# Patient Record
Sex: FEMALE | Race: ASIAN | Employment: FULL TIME | URBAN - METROPOLITAN AREA
[De-identification: names, ages, dates, MRNs, and addresses within clinical notes are randomized per-mention and may not be internally consistent; named-entity substitution may affect disease eponyms.]

---

## 2023-04-06 ENCOUNTER — OFFICE VISIT (OUTPATIENT)
Dept: PRIMARY CARE CLINIC | Age: 53
End: 2023-04-06
Payer: COMMERCIAL

## 2023-04-06 VITALS
SYSTOLIC BLOOD PRESSURE: 127 MMHG | BODY MASS INDEX: 33.97 KG/M2 | OXYGEN SATURATION: 96 % | TEMPERATURE: 97.5 F | HEIGHT: 55 IN | DIASTOLIC BLOOD PRESSURE: 59 MMHG | WEIGHT: 146.8 LBS | HEART RATE: 64 BPM

## 2023-04-06 DIAGNOSIS — M54.12 CERVICAL RADICULOPATHY: Primary | ICD-10-CM

## 2023-04-06 DIAGNOSIS — M54.16 LUMBAR BACK PAIN WITH RADICULOPATHY AFFECTING LOWER EXTREMITY: ICD-10-CM

## 2023-04-06 PROCEDURE — 99204 OFFICE O/P NEW MOD 45 MIN: CPT | Performed by: NURSE PRACTITIONER

## 2023-04-06 RX ORDER — NAPROXEN 500 MG/1
500 TABLET ORAL 2 TIMES DAILY PRN
Qty: 60 TABLET | Refills: 1 | Status: SHIPPED | OUTPATIENT
Start: 2023-04-06

## 2023-04-06 SDOH — ECONOMIC STABILITY: FOOD INSECURITY: WITHIN THE PAST 12 MONTHS, THE FOOD YOU BOUGHT JUST DIDN'T LAST AND YOU DIDN'T HAVE MONEY TO GET MORE.: NEVER TRUE

## 2023-04-06 SDOH — ECONOMIC STABILITY: HOUSING INSECURITY
IN THE LAST 12 MONTHS, WAS THERE A TIME WHEN YOU DID NOT HAVE A STEADY PLACE TO SLEEP OR SLEPT IN A SHELTER (INCLUDING NOW)?: NO

## 2023-04-06 SDOH — ECONOMIC STABILITY: INCOME INSECURITY: HOW HARD IS IT FOR YOU TO PAY FOR THE VERY BASICS LIKE FOOD, HOUSING, MEDICAL CARE, AND HEATING?: NOT HARD AT ALL

## 2023-04-06 SDOH — ECONOMIC STABILITY: FOOD INSECURITY: WITHIN THE PAST 12 MONTHS, YOU WORRIED THAT YOUR FOOD WOULD RUN OUT BEFORE YOU GOT MONEY TO BUY MORE.: NEVER TRUE

## 2023-04-06 ASSESSMENT — PATIENT HEALTH QUESTIONNAIRE - PHQ9
SUM OF ALL RESPONSES TO PHQ QUESTIONS 1-9: 0
SUM OF ALL RESPONSES TO PHQ9 QUESTIONS 1 & 2: 0
1. LITTLE INTEREST OR PLEASURE IN DOING THINGS: 0
SUM OF ALL RESPONSES TO PHQ QUESTIONS 1-9: 0
2. FEELING DOWN, DEPRESSED OR HOPELESS: 0
SUM OF ALL RESPONSES TO PHQ QUESTIONS 1-9: 0
SUM OF ALL RESPONSES TO PHQ QUESTIONS 1-9: 0

## 2023-04-06 NOTE — PROGRESS NOTES
[address] [facility] [address] PRIMARY CARE  [address] [place] [id number]  Dept: [phone]  Dept Fax: [phone]     4/6/2023      Ivanna Shailesh   1970     Chief Complaint   Patient presents with    Other     Pain in both hands starts from top of shoulders and goes down to the end of fingers , swelling in fingers when she wakes up back pain disc l4 and l5        HPI     Patient presents with her son and  was used for this visit. Patient has complaint of bilateral arm pain. States it starts in top of shoulders and goes down to tip of fingers. Pain is throbbing, severe. Pain is sometimes worse at night and sometimes worse in the mornings. She states pain is worse with movement. She denies any numbness but does have tingling when pain is present. Pain comes and goes, but is present every day. She denies any weakness in arms or hands. She does have chronic neck pain. She states it has been going on more than 10 years. She has not seen a specialist for this before. She also has chronic low back pain. States at least for 16 years. She occasionally uses Tylenol and Ibuprofen for pain. Pain often radiates down left leg and sometimes the right. She denies any weakness. States she did have imaging a few years ago in [facility]. Records not available. PHQ Scores 4/6/2023   PHQ2 Score 0   PHQ9 Score 0     Interpretation of Total Score Depression Severity: 1-4 = Minimal depression, 5-9 = Mild depression, 10-14 = Moderate depression, 15-19 = Moderately severe depression, 20-27 = Severe depression     Prior to Visit Medications    Not on File       No past medical history on file. Social History     Tobacco Use    Smoking status: Never    Smokeless tobacco: Never        No past surgical history on file. No Known Allergies     No family history on file.      Patient's past medical history, surgical history, family history, medications, and allergies  were all

## 2023-04-07 ASSESSMENT — ENCOUNTER SYMPTOMS
BACK PAIN: 1
ABDOMINAL PAIN: 0
COUGH: 0

## 2023-08-30 ENCOUNTER — TELEPHONE (OUTPATIENT)
Dept: PRIMARY CARE CLINIC | Age: 53
End: 2023-08-30

## 2023-08-30 NOTE — TELEPHONE ENCOUNTER
Please call patient and encourage her to set up physical with me. This does not need to be urgent but I would like it to be done within the next few weeks if possible. If you do schedule her please schedule for 40-minute visit. She will need an English .

## 2023-08-31 NOTE — TELEPHONE ENCOUNTER
Patient was called with  and a number was left for the patient to get scheduled waiting for call back

## 2023-09-25 ENCOUNTER — HOSPITAL ENCOUNTER (EMERGENCY)
Age: 53
Discharge: HOME | End: 2023-09-25
Attending: EMERGENCY MEDICINE
Payer: COMMERCIAL

## 2023-09-25 ENCOUNTER — APPOINTMENT (OUTPATIENT)
Dept: GENERAL RADIOLOGY | Age: 53
End: 2023-09-25
Payer: COMMERCIAL

## 2023-09-25 VITALS
WEIGHT: 152.2 LBS | BODY MASS INDEX: 28.01 KG/M2 | DIASTOLIC BLOOD PRESSURE: 64 MMHG | TEMPERATURE: 100.3 F | RESPIRATION RATE: 22 BRPM | SYSTOLIC BLOOD PRESSURE: 98 MMHG | OXYGEN SATURATION: 95 % | HEIGHT: 62 IN | HEART RATE: 97 BPM

## 2023-09-25 DIAGNOSIS — U07.1 COVID-19: Primary | ICD-10-CM

## 2023-09-25 LAB
ALBUMIN SERPL-MCNC: 4.3 G/DL (ref 3.4–5)
ALBUMIN/GLOB SERPL: 1.6 {RATIO} (ref 1.1–2.2)
ALP SERPL-CCNC: 100 U/L (ref 40–129)
ALT SERPL-CCNC: 12 U/L (ref 10–40)
AMORPH SED URNS QL MICRO: ABNORMAL /HPF
ANION GAP SERPL CALCULATED.3IONS-SCNC: 8 MMOL/L (ref 3–16)
AST SERPL-CCNC: 15 U/L (ref 15–37)
BACTERIA URNS QL MICRO: ABNORMAL /HPF
BASOPHILS # BLD: 0 K/UL (ref 0–0.2)
BASOPHILS NFR BLD: 0.4 %
BILIRUB SERPL-MCNC: <0.2 MG/DL (ref 0–1)
BILIRUB UR QL STRIP.AUTO: NEGATIVE
BUN SERPL-MCNC: 11 MG/DL (ref 7–20)
CALCIUM SERPL-MCNC: 9 MG/DL (ref 8.3–10.6)
CHLORIDE SERPL-SCNC: 105 MMOL/L (ref 99–110)
CLARITY UR: ABNORMAL
CO2 SERPL-SCNC: 27 MMOL/L (ref 21–32)
COLOR UR: YELLOW
CREAT SERPL-MCNC: 0.6 MG/DL (ref 0.6–1.1)
DEPRECATED RDW RBC AUTO: 12.6 % (ref 12.4–15.4)
EKG ATRIAL RATE: 100 BPM
EKG DIAGNOSIS: NORMAL
EKG P AXIS: 61 DEGREES
EKG P-R INTERVAL: 172 MS
EKG Q-T INTERVAL: 338 MS
EKG QRS DURATION: 86 MS
EKG QTC CALCULATION (BAZETT): 436 MS
EKG R AXIS: 89 DEGREES
EKG T AXIS: 36 DEGREES
EKG VENTRICULAR RATE: 100 BPM
EOSINOPHIL # BLD: 0 K/UL (ref 0–0.6)
EOSINOPHIL NFR BLD: 0.3 %
EPI CELLS #/AREA URNS HPF: ABNORMAL /HPF (ref 0–5)
FLUAV RNA UPPER RESP QL NAA+PROBE: NEGATIVE
FLUBV AG NPH QL: NEGATIVE
GFR SERPLBLD CREATININE-BSD FMLA CKD-EPI: >60 ML/MIN/{1.73_M2}
GLUCOSE SERPL-MCNC: 102 MG/DL (ref 70–99)
GLUCOSE UR STRIP.AUTO-MCNC: NEGATIVE MG/DL
HCT VFR BLD AUTO: 36.3 % (ref 36–48)
HGB BLD-MCNC: 12.6 G/DL (ref 12–16)
HGB UR QL STRIP.AUTO: NEGATIVE
KETONES UR STRIP.AUTO-MCNC: NEGATIVE MG/DL
LEUKOCYTE ESTERASE UR QL STRIP.AUTO: ABNORMAL
LIPASE SERPL-CCNC: 32 U/L (ref 13–60)
LYMPHOCYTES # BLD: 0.3 K/UL (ref 1–5.1)
LYMPHOCYTES NFR BLD: 6.7 %
MAGNESIUM SERPL-MCNC: 1.8 MG/DL (ref 1.8–2.4)
MCH RBC QN AUTO: 32.3 PG (ref 26–34)
MCHC RBC AUTO-ENTMCNC: 34.6 G/DL (ref 31–36)
MCV RBC AUTO: 93.4 FL (ref 80–100)
MONOCYTES # BLD: 0.5 K/UL (ref 0–1.3)
MONOCYTES NFR BLD: 8.9 %
NEUTROPHILS # BLD: 4.4 K/UL (ref 1.7–7.7)
NEUTROPHILS NFR BLD: 83.7 %
NITRITE UR QL STRIP.AUTO: NEGATIVE
NT-PROBNP SERPL-MCNC: 200 PG/ML (ref 0–124)
PH UR STRIP.AUTO: 6 [PH] (ref 5–8)
PLATELET # BLD AUTO: 140 K/UL (ref 135–450)
PMV BLD AUTO: 10.7 FL (ref 5–10.5)
POTASSIUM SERPL-SCNC: 3.8 MMOL/L (ref 3.5–5.1)
PROT SERPL-MCNC: 7 G/DL (ref 6.4–8.2)
PROT UR STRIP.AUTO-MCNC: NEGATIVE MG/DL
RBC # BLD AUTO: 3.89 M/UL (ref 4–5.2)
RBC #/AREA URNS HPF: ABNORMAL /HPF (ref 0–4)
SARS-COV-2 RDRP RESP QL NAA+PROBE: DETECTED
SODIUM SERPL-SCNC: 140 MMOL/L (ref 136–145)
SP GR UR STRIP.AUTO: 1.02 (ref 1–1.03)
TROPONIN, HIGH SENSITIVITY: <6 NG/L (ref 0–14)
UA COMPLETE W REFLEX CULTURE PNL UR: YES
UA DIPSTICK W REFLEX MICRO PNL UR: YES
URN SPEC COLLECT METH UR: ABNORMAL
UROBILINOGEN UR STRIP-ACNC: 0.2 E.U./DL
WBC # BLD AUTO: 5.2 K/UL (ref 4–11)
WBC #/AREA URNS HPF: ABNORMAL /HPF (ref 0–5)

## 2023-09-25 PROCEDURE — 83880 ASSAY OF NATRIURETIC PEPTIDE: CPT

## 2023-09-25 PROCEDURE — 83690 ASSAY OF LIPASE: CPT

## 2023-09-25 PROCEDURE — 71046 X-RAY EXAM CHEST 2 VIEWS: CPT

## 2023-09-25 PROCEDURE — 93005 ELECTROCARDIOGRAM TRACING: CPT

## 2023-09-25 PROCEDURE — 87635 SARS-COV-2 COVID-19 AMP PRB: CPT

## 2023-09-25 PROCEDURE — 83735 ASSAY OF MAGNESIUM: CPT

## 2023-09-25 PROCEDURE — 85025 COMPLETE CBC W/AUTO DIFF WBC: CPT

## 2023-09-25 PROCEDURE — 99285 EMERGENCY DEPT VISIT HI MDM: CPT

## 2023-09-25 PROCEDURE — 96375 TX/PRO/DX INJ NEW DRUG ADDON: CPT

## 2023-09-25 PROCEDURE — 87086 URINE CULTURE/COLONY COUNT: CPT

## 2023-09-25 PROCEDURE — 2580000003 HC RX 258

## 2023-09-25 PROCEDURE — 84484 ASSAY OF TROPONIN QUANT: CPT

## 2023-09-25 PROCEDURE — 36415 COLL VENOUS BLD VENIPUNCTURE: CPT

## 2023-09-25 PROCEDURE — 96374 THER/PROPH/DIAG INJ IV PUSH: CPT

## 2023-09-25 PROCEDURE — 80053 COMPREHEN METABOLIC PANEL: CPT

## 2023-09-25 PROCEDURE — 6370000000 HC RX 637 (ALT 250 FOR IP)

## 2023-09-25 PROCEDURE — 87804 INFLUENZA ASSAY W/OPTIC: CPT

## 2023-09-25 PROCEDURE — 6360000002 HC RX W HCPCS

## 2023-09-25 PROCEDURE — 81001 URINALYSIS AUTO W/SCOPE: CPT

## 2023-09-25 RX ORDER — ONDANSETRON 4 MG/1
4 TABLET, ORALLY DISINTEGRATING ORAL 3 TIMES DAILY PRN
Qty: 21 TABLET | Refills: 0 | Status: SHIPPED | OUTPATIENT
Start: 2023-09-25

## 2023-09-25 RX ORDER — KETOROLAC TROMETHAMINE 30 MG/ML
15 INJECTION, SOLUTION INTRAMUSCULAR; INTRAVENOUS ONCE
Status: COMPLETED | OUTPATIENT
Start: 2023-09-25 | End: 2023-09-25

## 2023-09-25 RX ORDER — SODIUM CHLORIDE, SODIUM LACTATE, POTASSIUM CHLORIDE, AND CALCIUM CHLORIDE .6; .31; .03; .02 G/100ML; G/100ML; G/100ML; G/100ML
1000 INJECTION, SOLUTION INTRAVENOUS ONCE
Status: COMPLETED | OUTPATIENT
Start: 2023-09-25 | End: 2023-09-25

## 2023-09-25 RX ORDER — ACETAMINOPHEN 500 MG
1000 TABLET ORAL
Status: COMPLETED | OUTPATIENT
Start: 2023-09-25 | End: 2023-09-25

## 2023-09-25 RX ORDER — ONDANSETRON 2 MG/ML
4 INJECTION INTRAMUSCULAR; INTRAVENOUS ONCE
Status: COMPLETED | OUTPATIENT
Start: 2023-09-25 | End: 2023-09-25

## 2023-09-25 RX ADMIN — KETOROLAC TROMETHAMINE 15 MG: 30 INJECTION, SOLUTION INTRAMUSCULAR; INTRAVENOUS at 09:40

## 2023-09-25 RX ADMIN — SODIUM CHLORIDE, POTASSIUM CHLORIDE, SODIUM LACTATE AND CALCIUM CHLORIDE 1000 ML: 600; 310; 30; 20 INJECTION, SOLUTION INTRAVENOUS at 09:38

## 2023-09-25 RX ADMIN — ONDANSETRON 4 MG: 2 INJECTION INTRAMUSCULAR; INTRAVENOUS at 09:39

## 2023-09-25 RX ADMIN — ACETAMINOPHEN 1000 MG: 500 TABLET ORAL at 09:41

## 2023-09-25 ASSESSMENT — ENCOUNTER SYMPTOMS
CONSTIPATION: 0
VOMITING: 1
COUGH: 1
EYE DISCHARGE: 0
WHEEZING: 0
NAUSEA: 1
BACK PAIN: 0
DIARRHEA: 0
EYE ITCHING: 0
EYE PAIN: 0
RHINORRHEA: 1
SHORTNESS OF BREATH: 0
ABDOMINAL PAIN: 0
SORE THROAT: 1

## 2023-09-25 ASSESSMENT — PAIN DESCRIPTION - DESCRIPTORS: DESCRIPTORS: ACHING;SORE

## 2023-09-25 ASSESSMENT — PAIN DESCRIPTION - LOCATION: LOCATION: CHEST

## 2023-09-25 ASSESSMENT — PAIN DESCRIPTION - ORIENTATION: ORIENTATION: MID

## 2023-09-25 ASSESSMENT — PAIN SCALES - GENERAL: PAINLEVEL_OUTOF10: 9

## 2023-09-25 ASSESSMENT — PAIN - FUNCTIONAL ASSESSMENT: PAIN_FUNCTIONAL_ASSESSMENT: 0-10

## 2023-09-25 NOTE — ED NOTES
Pt ambulated around room, O2 sats remained above 94% and pt tolerated well.  PA made aware      Shaan Obrien RN  09/25/23 1475

## 2023-09-25 NOTE — ED PROVIDER NOTES
[address] [facility] ENCOUNTER          PHYSICIAN ASSISTANT NOTE       Date of evaluation: 9/25/2023    Chief Complaint     Chest Pain (Reports midsternal cp since last night denies sob, pt reports neck pain with this, and nausea. Pt has 100.3 temp in triage. )      History of Present Illness     Elizabeth Gagnon is a 45 y.o. female who presents to the emergency department with chief complaint of fever, chest pain, myalgias, congestion, sore throat. The patient notes that her symptoms started yesterday. She has not taken her temperature at home but has felt feverish. She states that her whole body hurts but mostly it is her chest.  She is unable to describe what the pain feels like. She does have a cough but it is minimal and nonproductive. She does not have any abdominal pain but does endorse nausea and vomiting. She has not taken anything for her symptoms at home. She does not have any medical issues for which she takes any medications for a daily basis. She denies any history of VTE, recent travel, recent surgery, unilateral leg pain or swelling. She denies any urinary symptoms. Review of Systems     Review of Systems   Constitutional:  Positive for chills, fatigue and fever. HENT:  Positive for rhinorrhea and sore throat. Negative for congestion. Eyes:  Negative for pain, discharge and itching. Respiratory:  Positive for cough. Negative for shortness of breath and wheezing. Cardiovascular:  Positive for chest pain. Negative for palpitations and leg swelling. Gastrointestinal:  Positive for nausea and vomiting. Negative for abdominal pain, constipation and diarrhea. Genitourinary:  Negative for dysuria, hematuria and urgency. Musculoskeletal:  Negative for back pain, joint swelling and neck pain. Skin:  Negative for rash. Neurological:  Negative for syncope, facial asymmetry and light-headedness.    Psychiatric/Behavioral:  Negative for behavioral problems and

## 2023-09-25 NOTE — ED PROVIDER NOTES
ED Attending Attestation Note     Date of evaluation: 9/25/2023    This patient was seen by the advance practice provider. I have seen and examined the patient, agree with the workup, evaluation, management and diagnosis. The care plan has been discussed. I have reviewed the ECG and concur with the SYLWIA's interpretation. Briefly, Kaylin Keys is a 45 y.o. female with a PMH inclusive of none who presents for evaluation of Chest pain    Fever, chills, CP, myalgias, sore throat. NO prior cardiac history. Notable exam findings include appears fatigues, mildly tachycardic, O2 sat low 90s    Assessment/ Medical Decision Making:     History and exam most consistent with COVID infection. Labs reassuring. She was treated symptomatically and improved. Able to ambulate and maintain sat. Will discharge to home with return precautions. Considered PE, but COVID explains symptoms and is more likely cause of presentation. NO other known risk factors for PE. Do not feel that she needs PE workup at this time, but counseled to return for worsening sx.             Agueda Morales MD  09/25/23 [id number]

## 2023-09-25 NOTE — DISCHARGE INSTRUCTIONS
Sherryle Masters, seen in the emergency department for a fever, chest pain. Your work-up is reassuring against any issues with your heart or any other medical emergency. Your COVID-19 test was positive. I highly suspect that this is causing all of your symptoms. Treatment for this revolves around treating the symptoms. I highly recommend that you take 800 mg of ibuprofen 3 times a day in addition to 1000 mg of Tylenol 3 times a day. This will help with any aches, pains, fevers. If you continue to have issues with congestion, sinus pressure or coughing I highly recommend taking Mucinex which you can get over-the-counter as well. Even if you do not have an appetite is very important that you are eating at least 3 small meals per day. Make sure that you are drinking at least 80 ounces of fluids per day to maintain hydration. If you have severe difficulty breathing or other worsening symptoms please return to the ER. Otherwise follow-up with your primary care doctor.

## 2023-09-26 LAB — BACTERIA UR CULT: NORMAL

## 2023-10-02 ENCOUNTER — HOSPITAL ENCOUNTER (EMERGENCY)
Age: 53
Discharge: HOME | End: 2023-10-02
Payer: COMMERCIAL

## 2023-10-02 VITALS
TEMPERATURE: 98.7 F | HEART RATE: 88 BPM | SYSTOLIC BLOOD PRESSURE: 155 MMHG | RESPIRATION RATE: 16 BRPM | DIASTOLIC BLOOD PRESSURE: 100 MMHG | BODY MASS INDEX: 27.46 KG/M2 | WEIGHT: 150.13 LBS | OXYGEN SATURATION: 98 %

## 2023-10-02 DIAGNOSIS — Z20.822 ENCOUNTER FOR LABORATORY TESTING FOR COVID-19 VIRUS: Primary | ICD-10-CM

## 2023-10-02 LAB — SARS-COV-2 RDRP RESP QL NAA+PROBE: NOT DETECTED

## 2023-10-02 PROCEDURE — 99283 EMERGENCY DEPT VISIT LOW MDM: CPT

## 2023-10-02 PROCEDURE — 87635 SARS-COV-2 COVID-19 AMP PRB: CPT

## 2023-10-02 ASSESSMENT — PAIN - FUNCTIONAL ASSESSMENT: PAIN_FUNCTIONAL_ASSESSMENT: NONE - DENIES PAIN

## 2023-10-02 ASSESSMENT — ENCOUNTER SYMPTOMS
NAUSEA: 0
SHORTNESS OF BREATH: 0
VOMITING: 0
COUGH: 0
DIARRHEA: 0
SORE THROAT: 0
WHEEZING: 0
ABDOMINAL PAIN: 0

## 2023-10-02 NOTE — ED PROVIDER NOTES
Skin:  Negative for rash. Neurological:  Negative for dizziness, seizures, weakness and headaches. Hematological:  Does not bruise/bleed easily. Psychiatric/Behavioral:  Negative for hallucinations and suicidal ideas. All other systems reviewed and are negative. Past Medical, Surgical, Family, and Social History     She has no past medical history on file. She has no past surgical history on file. Her family history is not on file. She reports that she has never smoked. She has never used smokeless tobacco. She reports that she does not drink alcohol and does not use drugs. Medications     Current Discharge Medication List        CONTINUE these medications which have NOT CHANGED    Details   ondansetron (ZOFRAN-ODT) 4 MG disintegrating tablet Take 1 tablet by mouth 3 times daily as needed for Nausea or Vomiting  Qty: 21 tablet, Refills: 0             Allergies     She has No Known Allergies.           [address], [place]  10/02/23 1929

## 2023-10-02 NOTE — DISCHARGE INSTRUCTIONS
Thank you for choosing [address] [facility] for your emergency care today. We take a lot of pride in the fact that you chose us for your care and we strived to do everything necessary to provide you with excellent medical care. We hope you agree that you received excellent care today. To continue that excellent medical care, the following information very important that you read and understand before you leave the emergency department today. It contains information about:  Your diagnosis  What was done for you in the emergency department  What you can expect from your illness or injury moving forward  The steps you will need to take after leaving the emergency department to help achieve the best possible outcome for your illness or injury. What we did in the Emergency Department Today:     You were seen in the Emergency Department today by Markos Howell PA-C. You had the following lab abnormalities:  Labs Reviewed   COVID-19, RAPID   Negative- NOT DETECTED    If no result appears for the test, then it was within normal limits. If the test is pending, the hospital will v[facility]quez you if the results are abnormal as soon as the test is completed. You had the following diagnostic imaging:  No orders to display       You were given the following medications during your stay in the Emergency Department:  No orders of the defined types were placed in this encounter. You were diagnosed with the followin. Encounter for laboratory testing for COVID-19 virus      IMPORTANT: If your condition worsens, or your development symptoms that you find concerning and want to have checked out immediately, do not hesitate to return to the Emergency Department. You can call  and have trained Emergency Medical Service providers bring you to the emergency department if you can't do so safely and quickly.  They can begin the medical evaluation, on scene, wherever you are located and can